# Patient Record
Sex: FEMALE
[De-identification: names, ages, dates, MRNs, and addresses within clinical notes are randomized per-mention and may not be internally consistent; named-entity substitution may affect disease eponyms.]

---

## 2018-10-16 ENCOUNTER — TRANSCRIPTION ENCOUNTER (OUTPATIENT)
Age: 43
End: 2018-10-16

## 2018-10-18 ENCOUNTER — TRANSCRIPTION ENCOUNTER (OUTPATIENT)
Age: 43
End: 2018-10-18

## 2019-09-25 ENCOUNTER — APPOINTMENT (OUTPATIENT)
Dept: ORTHOPEDIC SURGERY | Facility: CLINIC | Age: 44
End: 2019-09-25
Payer: COMMERCIAL

## 2019-09-25 ENCOUNTER — APPOINTMENT (OUTPATIENT)
Dept: ORTHOPEDIC SURGERY | Facility: CLINIC | Age: 44
End: 2019-09-25

## 2019-09-25 VITALS — BODY MASS INDEX: 20.4 KG/M2 | HEIGHT: 67 IN | WEIGHT: 130 LBS

## 2019-09-25 PROBLEM — Z00.00 ENCOUNTER FOR PREVENTIVE HEALTH EXAMINATION: Status: ACTIVE | Noted: 2019-09-25

## 2019-09-25 PROCEDURE — 99204 OFFICE O/P NEW MOD 45 MIN: CPT

## 2019-09-25 PROCEDURE — 73630 X-RAY EXAM OF FOOT: CPT | Mod: RT

## 2019-09-25 NOTE — REVIEW OF SYSTEMS
[Arthralgia] : arthralgia [Joint Pain] : joint pain [Joint Stiffness] : joint stiffness [Joint Swelling] : joint swelling [Negative] : Psychiatric

## 2019-09-25 NOTE — PHYSICAL EXAM
[de-identified] : Foot shows swelling tenderness over the second and third metatarsals. Otherwise exam is normal neurovascular exam is normal [de-identified] : Foot x-ray shows a second metatarsal stress fracture of the right foot

## 2019-09-25 NOTE — DISCUSSION/SUMMARY
[de-identified] : This patient will be placed into a Cam Walker boot weightbearing as tolerated she'll follow up in 3 weeks I will get an x-ray of her right foot in 3 weeks total amount of immobilization 6 weeks

## 2019-09-25 NOTE — HISTORY OF PRESENT ILLNESS
[FreeTextEntry1] : 44-year-old female with right foot pain for 2 or 3 weeks. No specific injury. She does work on her feet a left. She also plays tennis per she's had some swelling. No prior problems. The pain is not getting better. She's had a previous x-ray.

## 2019-10-16 ENCOUNTER — TRANSCRIPTION ENCOUNTER (OUTPATIENT)
Age: 44
End: 2019-10-16

## 2019-10-16 ENCOUNTER — APPOINTMENT (OUTPATIENT)
Dept: ORTHOPEDIC SURGERY | Facility: CLINIC | Age: 44
End: 2019-10-16
Payer: COMMERCIAL

## 2019-10-16 PROCEDURE — 73630 X-RAY EXAM OF FOOT: CPT | Mod: RT

## 2019-10-16 PROCEDURE — 99213 OFFICE O/P EST LOW 20 MIN: CPT

## 2019-10-16 NOTE — HISTORY OF PRESENT ILLNESS
[FreeTextEntry1] : She is here for followup of her right second metatarsal stress fracture. The symptoms began about 5 or 6 weeks ago. She was last seen on 9/25/2019. She is using a cam walker boot. The pain is slowly improving.

## 2019-10-16 NOTE — PHYSICAL EXAM
[de-identified] : X-ray of the right foot shows a healing second metatarsal stress [de-identified] : Right foot shows minimal swelling minimal discomfort over the second metatarsal normal range of motion neurovascular exam is

## 2019-10-16 NOTE — DISCUSSION/SUMMARY
[de-identified] : Continue with the boot for now. She will followup in 4 weeks. She is going away on vacation the third week to [place] and she can slowly transition away from the boot at that time. X-ray of the right foot in 4 weeks.

## 2019-11-12 ENCOUNTER — APPOINTMENT (OUTPATIENT)
Dept: ORTHOPEDIC SURGERY | Facility: CLINIC | Age: 44
End: 2019-11-12
Payer: COMMERCIAL

## 2019-11-12 PROCEDURE — 99213 OFFICE O/P EST LOW 20 MIN: CPT

## 2019-11-12 PROCEDURE — 73630 X-RAY EXAM OF FOOT: CPT | Mod: RT

## 2019-11-12 NOTE — PHYSICAL EXAM
[de-identified] : Right foot shows no warmth or swelling. There is minimal tenderness over the second and third metatarsals. Full range of motion neurovascular exam is normal. [de-identified] : Right foot x-ray shows healing stress fracture second metatarsal

## 2019-11-12 NOTE — DISCUSSION/SUMMARY
[de-identified] : She will slowly transition him away from the boot. I will have a clinical check with her in about 3 weeks. Followup at that time to

## 2019-11-12 NOTE — HISTORY OF PRESENT ILLNESS
[FreeTextEntry1] : She is here for followup of her right foot. The pain has been improving pain swelling has been going down. She is walking on a little bit without the boot without pain. She does work about 70% of time on her feet

## 2019-12-03 ENCOUNTER — APPOINTMENT (OUTPATIENT)
Dept: ORTHOPEDIC SURGERY | Facility: CLINIC | Age: 44
End: 2019-12-03
Payer: COMMERCIAL

## 2019-12-03 DIAGNOSIS — M25.571 PAIN IN RIGHT ANKLE AND JOINTS OF RIGHT FOOT: ICD-10-CM

## 2019-12-03 PROCEDURE — 99213 OFFICE O/P EST LOW 20 MIN: CPT

## 2019-12-03 NOTE — HISTORY OF PRESENT ILLNESS
[FreeTextEntry1] : She is doing well with regard to her right foot. She does have occasional discomfort. She has taken a good supportive shoes are better. She is back to her normal routine of work. She still is using good supportive shoes.

## 2019-12-03 NOTE — PHYSICAL EXAM
[de-identified] : Right foot shows no warmth or swelling. There is no tenderness over the second and third metatarsals. Full range of motion neurovascular exam is normal.

## 2019-12-03 NOTE — DISCUSSION/SUMMARY
[de-identified] : This patient is doing well. She can slowly progress her activities. Continue to wear supportive shoes. If in one month her snow improvement she will return to

## 2019-12-03 NOTE — REVIEW OF SYSTEMS
[Arthralgia] : no arthralgia [Joint Pain] : no joint pain [Joint Swelling] : no joint swelling [Joint Stiffness] : no joint stiffness [Negative] : Endocrine

## 2020-10-19 ENCOUNTER — TRANSCRIPTION ENCOUNTER (OUTPATIENT)
Age: 45
End: 2020-10-19